# Patient Record
Sex: FEMALE | Race: WHITE
[De-identification: names, ages, dates, MRNs, and addresses within clinical notes are randomized per-mention and may not be internally consistent; named-entity substitution may affect disease eponyms.]

---

## 2019-04-03 ENCOUNTER — HOSPITAL ENCOUNTER (EMERGENCY)
Dept: HOSPITAL 54 - ER | Age: 29
LOS: 1 days | Discharge: HOME | End: 2019-04-04
Payer: MEDICAID

## 2019-04-03 VITALS — WEIGHT: 108 LBS | BODY MASS INDEX: 20.39 KG/M2 | HEIGHT: 61 IN

## 2019-04-03 DIAGNOSIS — F19.10: ICD-10-CM

## 2019-04-03 DIAGNOSIS — F10.239: Primary | ICD-10-CM
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
## 2019-04-03 LAB
ALBUMIN SERPL BCP-MCNC: 4.2 G/DL (ref 3.4–5)
ALP SERPL-CCNC: 114 U/L (ref 46–116)
ALT SERPL W P-5'-P-CCNC: 37 U/L (ref 12–78)
APAP SERPL-MCNC: < 2 UG/ML (ref 10–30)
AST SERPL W P-5'-P-CCNC: 54 U/L (ref 15–37)
BASOPHILS # BLD AUTO: 0 /CMM (ref 0–0.2)
BASOPHILS NFR BLD AUTO: 0.5 % (ref 0–2)
BILIRUB DIRECT SERPL-MCNC: 0.1 MG/DL (ref 0–0.2)
BILIRUB SERPL-MCNC: 0.7 MG/DL (ref 0.2–1)
BUN SERPL-MCNC: 12 MG/DL (ref 7–18)
CALCIUM SERPL-MCNC: 9.1 MG/DL (ref 8.5–10.1)
CHLORIDE SERPL-SCNC: 101 MMOL/L (ref 98–107)
CO2 SERPL-SCNC: 21 MMOL/L (ref 21–32)
CREAT SERPL-MCNC: 0.8 MG/DL (ref 0.6–1.3)
EOSINOPHIL NFR BLD AUTO: 0 % (ref 0–6)
ETHANOL SERPL-MCNC: < 3 MG/DL (ref 0–0)
GLUCOSE SERPL-MCNC: 144 MG/DL (ref 74–106)
HCT VFR BLD AUTO: 37 % (ref 33–45)
HGB BLD-MCNC: 12.6 G/DL (ref 11.5–14.8)
KETONES UR STRIP-MCNC: 15 MG/DL
LYMPHOCYTES NFR BLD AUTO: 0.7 /CMM (ref 0.8–4.8)
LYMPHOCYTES NFR BLD AUTO: 7.1 % (ref 20–44)
MCHC RBC AUTO-ENTMCNC: 34 G/DL (ref 31–36)
MCV RBC AUTO: 94 FL (ref 82–100)
MONOCYTES NFR BLD AUTO: 0.5 /CMM (ref 0.1–1.3)
MONOCYTES NFR BLD AUTO: 5.2 % (ref 2–12)
NEUTROPHILS # BLD AUTO: 8.6 /CMM (ref 1.8–8.9)
NEUTROPHILS NFR BLD AUTO: 87.2 % (ref 43–81)
PH UR STRIP: 7 [PH] (ref 5–8)
PLATELET # BLD AUTO: 263 /CMM (ref 150–450)
POTASSIUM SERPL-SCNC: 3.8 MMOL/L (ref 3.5–5.1)
PROT SERPL-MCNC: 8.6 G/DL (ref 6.4–8.2)
RBC # BLD AUTO: 3.9 MIL/UL (ref 4–5.2)
RBC #/AREA URNS HPF: (no result) /HPF (ref 0–2)
SALICYLATES SERPL-MCNC: 2.4 MG/DL (ref 2.8–20)
SODIUM SERPL-SCNC: 138 MMOL/L (ref 136–145)
UROBILINOGEN UR STRIP-MCNC: 0.2 EU/DL
WBC #/AREA URNS HPF: (no result) /HPF (ref 0–3)
WBC NRBC COR # BLD AUTO: 9.9 K/UL (ref 4.3–11)

## 2019-04-03 PROCEDURE — 80329 ANALGESICS NON-OPIOID 1 OR 2: CPT

## 2019-04-03 PROCEDURE — 36415 COLL VENOUS BLD VENIPUNCTURE: CPT

## 2019-04-03 PROCEDURE — G0480 DRUG TEST DEF 1-7 CLASSES: HCPCS

## 2019-04-03 PROCEDURE — 80305 DRUG TEST PRSMV DIR OPT OBS: CPT

## 2019-04-03 PROCEDURE — 85025 COMPLETE CBC W/AUTO DIFF WBC: CPT

## 2019-04-03 PROCEDURE — 96374 THER/PROPH/DIAG INJ IV PUSH: CPT

## 2019-04-03 PROCEDURE — 80076 HEPATIC FUNCTION PANEL: CPT

## 2019-04-03 PROCEDURE — 81001 URINALYSIS AUTO W/SCOPE: CPT

## 2019-04-03 PROCEDURE — 80048 BASIC METABOLIC PNL TOTAL CA: CPT

## 2019-04-03 PROCEDURE — 99283 EMERGENCY DEPT VISIT LOW MDM: CPT

## 2019-04-03 PROCEDURE — 84703 CHORIONIC GONADOTROPIN ASSAY: CPT

## 2019-04-03 PROCEDURE — 80307 DRUG TEST PRSMV CHEM ANLYZR: CPT

## 2019-04-03 NOTE — NUR
[id number] C/O NAUSEA AND SHAKES, ETOH WITHDRAWAL.  LAST ETOH LAST 
NIGHT.  PT IS AOX3, VSS, RR EVEN AND UNLABORED ON RA.  HAVING SOME NAUSEA AND 
VOMIT, BUT ONLY SOME LIQUID COMING OUT.  HOOKED TO MONITOR AND READY FOR EVAL.

## 2019-04-03 NOTE — NUR
Social service consult requested by RAYMOND Bond for homelessness and alcohol abuse. Pt. is a 
28 year old female who was brought to [facility] by ambulance for ETOH withdrawals. GAMAL met with pt. 
bedside. Pt. is alert and oriented x 4. Pt. appears disheveled and dirty. Pt. states she 
moved from [place] to [place] three months ago because of the weather. Pt. is homeless since 
moving from [place]. Pt. was homeless in [place] as well. Pt. states she drinks 1/5th of 
vodka per day. Pt. 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 
[employer] on the streets. Pt. uses crystal methamphetamines every few days. Pt. last used 
four days ago. Pt. stated she wants shelter placement. GAMAL informed pt. she will have to wait 
in the lobby until tomorrow when shelter placement is available. SW had called [facility] [phone] and was informed by staff Aarti that they have no beds 
tonight and to call tomorrow at 10AM. Pt. stated she is willing to stay in the lobby until 
tomorrow. SW to follow up with pt. regarding shelter placement tomorrow morning. Homeless 
Patient Waiver form and resources were placed in pt's chart in case pt. decided not wanting 
shelter anymore. GAMAL updated the doctor and RN Tex regarding pt. waiting in the lobby until 
tomorrow morning for shelter placement.

## 2019-04-04 VITALS — DIASTOLIC BLOOD PRESSURE: 67 MMHG | SYSTOLIC BLOOD PRESSURE: 112 MMHG

## 2019-04-04 NOTE — NUR
PT VERBALIZED TO SECURITY THAT SHE DOES NOT WANT TO BE PLACED IN SHELTER. 
 CRISELDA MADE AWARE. TAP CARD PROVIDED. Patient given written and 
verbal discharge instructions. Patient verbalizes understanding of 
instructions. Patient is ambulatory with steady gait. Patient given list of 
available shelters in surrounding area.

## 2019-04-04 NOTE — NUR
INFORMED  CRISELDA THAT SHE DOES NOT WANT TO BE PLACED IN A SHELTER, 
SIGNED HOMELESS WAIVER FORM, ORDERED BREAKFAST FROM DIETARY, TAP CARD ORDERED 
FROM SUPERVISOR.

## 2019-04-04 NOTE — NUR
SW met with pt. bedside to inquire if she was still interested in shelter placement. Pt. 
stated, " No, I want to go to [employer] to find my boyfriend." Pt. was given 
list of homeless shelters and homeless resources such as food [facility], transitional living, 
health clinics and drug rehab programs. Copy of resources given to the pt. were placed in 
pt's chart as well.  Homeless Patient Waiver Form was signed  by the pt. and placed in pt's 
chart. Pt. was provided with breakfast and TAP card. MICHAEL Paredes and pt's nurse were notified 
of pt's discharge plan. No other social service needs are requested at this time.

## 2019-04-04 NOTE — NUR
PT IN STABLE CONDITION, VSS, PROVIDED WITH RESOURCES. IV removed. Catheter 
intact and site benign. Pressure and 4x4 applied to site. No bleeding noted. ID 
BAND REMOVED. ASSISTED TO CHAIR ROOM 18 UNTIL AVAILABILITY OF SHELTER.

## 2019-04-04 NOTE — NUR
SW was informed by pt' s RN that pt. no longer wants shelter placement and  wants to leave. 
Pt. was provided with TAP card.

## 2019-04-04 NOTE — NUR
PT IN BED ASLEEP, EASILY AROUSABLE BY VOICE. STILL FEELS NAUSEAOUS AS 
VERBALIZED BY PT. HOOKED TO MONITOR, KEPT WARM AND SAFE. WILL CONTINUE TO 
MONITOR.

## 2020-02-18 ENCOUNTER — HOSPITAL ENCOUNTER (EMERGENCY)
Dept: HOSPITAL 54 - ER | Age: 30
Discharge: HOME | End: 2020-02-18
Payer: MEDICAID

## 2020-02-18 VITALS
SYSTOLIC BLOOD PRESSURE: 136 MMHG | DIASTOLIC BLOOD PRESSURE: 90 MMHG | BODY MASS INDEX: 19.63 KG/M2 | WEIGHT: 100 LBS | HEIGHT: 60 IN
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
## 2020-02-18 DIAGNOSIS — G43.909: ICD-10-CM

## 2020-02-18 DIAGNOSIS — S39.82XA: Primary | ICD-10-CM

## 2020-02-18 PROCEDURE — 96372 THER/PROPH/DIAG INJ SC/IM: CPT

## 2020-02-18 PROCEDURE — 99283 EMERGENCY DEPT VISIT LOW MDM: CPT

## 2020-02-18 PROCEDURE — 84703 CHORIONIC GONADOTROPIN ASSAY: CPT

## 2020-06-19 ENCOUNTER — HOSPITAL ENCOUNTER (EMERGENCY)
Dept: HOSPITAL 12 - ER | Age: 30
Discharge: HOME | End: 2020-06-19
Payer: COMMERCIAL

## 2020-06-19 VITALS — HEIGHT: 61 IN | WEIGHT: 105 LBS | BODY MASS INDEX: 19.83 KG/M2

## 2020-06-19 VITALS — DIASTOLIC BLOOD PRESSURE: 66 MMHG | SYSTOLIC BLOOD PRESSURE: 110 MMHG

## 2020-06-19 DIAGNOSIS — L03.011: Primary | ICD-10-CM

## 2020-06-19 DIAGNOSIS — G40.909: ICD-10-CM

## 2020-06-19 DIAGNOSIS — Z59.0: ICD-10-CM

## 2020-06-19 PROCEDURE — 99283 EMERGENCY DEPT VISIT LOW MDM: CPT

## 2020-06-19 PROCEDURE — A4663 DIALYSIS BLOOD PRESSURE CUFF: HCPCS

## 2020-06-19 PROCEDURE — 10060 I&D ABSCESS SIMPLE/SINGLE: CPT

## 2020-06-19 SDOH — ECONOMIC STABILITY - HOUSING INSECURITY: HOMELESSNESS: Z59.0
